# Patient Record
Sex: MALE | Race: WHITE | NOT HISPANIC OR LATINO | Employment: FULL TIME | URBAN - METROPOLITAN AREA
[De-identification: names, ages, dates, MRNs, and addresses within clinical notes are randomized per-mention and may not be internally consistent; named-entity substitution may affect disease eponyms.]

---

## 2021-02-04 ENCOUNTER — TRANSCRIBE ORDERS (OUTPATIENT)
Dept: ADMINISTRATIVE | Facility: HOSPITAL | Age: 51
End: 2021-02-04

## 2021-02-04 ENCOUNTER — HOSPITAL ENCOUNTER (OUTPATIENT)
Dept: CARDIOLOGY | Facility: HOSPITAL | Age: 51
Discharge: HOME | End: 2021-02-04
Admitting: NURSE PRACTITIONER

## 2021-02-04 DIAGNOSIS — M79.661 RIGHT CALF PAIN: ICD-10-CM

## 2021-02-04 DIAGNOSIS — M79.661 RIGHT CALF PAIN: Primary | ICD-10-CM

## 2021-02-04 LAB
BH CV LOWER VASCULAR LEFT COMMON FEMORAL AUGMENT: NORMAL
BH CV LOWER VASCULAR LEFT COMMON FEMORAL COMPETENT: NORMAL
BH CV LOWER VASCULAR LEFT COMMON FEMORAL COMPRESS: NORMAL
BH CV LOWER VASCULAR LEFT COMMON FEMORAL PHASIC: NORMAL
BH CV LOWER VASCULAR LEFT COMMON FEMORAL SPONT: NORMAL
BH CV LOWER VASCULAR RIGHT COMMON FEMORAL AUGMENT: NORMAL
BH CV LOWER VASCULAR RIGHT COMMON FEMORAL COMPETENT: NORMAL
BH CV LOWER VASCULAR RIGHT COMMON FEMORAL COMPRESS: NORMAL
BH CV LOWER VASCULAR RIGHT COMMON FEMORAL PHASIC: NORMAL
BH CV LOWER VASCULAR RIGHT COMMON FEMORAL SPONT: NORMAL
BH CV LOWER VASCULAR RIGHT DISTAL FEMORAL COMPRESS: NORMAL
BH CV LOWER VASCULAR RIGHT GASTRONEMIUS COMPRESS: NORMAL
BH CV LOWER VASCULAR RIGHT GREATER SAPH AK COMPRESS: NORMAL
BH CV LOWER VASCULAR RIGHT GREATER SAPH BK COMPRESS: NORMAL
BH CV LOWER VASCULAR RIGHT LESSER SAPH COMPRESS: NORMAL
BH CV LOWER VASCULAR RIGHT MID FEMORAL AUGMENT: NORMAL
BH CV LOWER VASCULAR RIGHT MID FEMORAL COMPETENT: NORMAL
BH CV LOWER VASCULAR RIGHT MID FEMORAL COMPRESS: NORMAL
BH CV LOWER VASCULAR RIGHT MID FEMORAL PHASIC: NORMAL
BH CV LOWER VASCULAR RIGHT MID FEMORAL SPONT: NORMAL
BH CV LOWER VASCULAR RIGHT PERONEAL COMPRESS: NORMAL
BH CV LOWER VASCULAR RIGHT POPLITEAL AUGMENT: NORMAL
BH CV LOWER VASCULAR RIGHT POPLITEAL COMPETENT: NORMAL
BH CV LOWER VASCULAR RIGHT POPLITEAL COMPRESS: NORMAL
BH CV LOWER VASCULAR RIGHT POPLITEAL PHASIC: NORMAL
BH CV LOWER VASCULAR RIGHT POPLITEAL SPONT: NORMAL
BH CV LOWER VASCULAR RIGHT POSTERIOR TIBIAL COMPRESS: NORMAL
BH CV LOWER VASCULAR RIGHT PROXIMAL FEMORAL COMPRESS: NORMAL
BH CV LOWER VASCULAR RIGHT SAPHENOFEMORAL JUNCTION COMPRESS: NORMAL
BH CV LOWER VASCULAR RIGHT VARICOSITY BK COMPRESS: NORMAL

## 2021-02-04 PROCEDURE — 93971 EXTREMITY STUDY: CPT

## 2022-05-20 ENCOUNTER — HOSPITAL ENCOUNTER (INPATIENT)
Facility: HOSPITAL | Age: 52
LOS: 4 days | Discharge: HOME | End: 2022-05-24
Attending: EMERGENCY MEDICINE | Admitting: INTERNAL MEDICINE

## 2022-05-20 ENCOUNTER — APPOINTMENT (OUTPATIENT)
Dept: GENERAL RADIOLOGY | Facility: HOSPITAL | Age: 52
End: 2022-05-20

## 2022-05-20 ENCOUNTER — APPOINTMENT (OUTPATIENT)
Dept: CT IMAGING | Facility: HOSPITAL | Age: 52
End: 2022-05-20

## 2022-05-20 DIAGNOSIS — R11.2 NAUSEA AND VOMITING, UNSPECIFIED VOMITING TYPE: ICD-10-CM

## 2022-05-20 DIAGNOSIS — K56.609 SMALL BOWEL OBSTRUCTION: Primary | ICD-10-CM

## 2022-05-20 LAB
ALBUMIN SERPL-MCNC: 4 G/DL (ref 3.5–5.2)
ALBUMIN/GLOB SERPL: 1.4 G/DL
ALP SERPL-CCNC: 103 U/L (ref 39–117)
ALT SERPL W P-5'-P-CCNC: 16 U/L (ref 1–41)
AMYLASE SERPL-CCNC: 30 U/L (ref 28–100)
ANION GAP SERPL CALCULATED.3IONS-SCNC: 12 MMOL/L (ref 5–15)
AST SERPL-CCNC: 14 U/L (ref 1–40)
BASOPHILS # BLD AUTO: 0 10*3/MM3 (ref 0–0.2)
BASOPHILS NFR BLD AUTO: 0.2 % (ref 0–1.5)
BILIRUB SERPL-MCNC: 0.5 MG/DL (ref 0–1.2)
BILIRUB UR QL STRIP: NEGATIVE
BUN SERPL-MCNC: 20 MG/DL (ref 6–20)
BUN/CREAT SERPL: 22 (ref 7–25)
CALCIUM SPEC-SCNC: 10.2 MG/DL (ref 8.6–10.5)
CHLORIDE SERPL-SCNC: 102 MMOL/L (ref 98–107)
CLARITY UR: ABNORMAL
CO2 SERPL-SCNC: 26 MMOL/L (ref 22–29)
COLOR UR: YELLOW
CREAT SERPL-MCNC: 0.91 MG/DL (ref 0.76–1.27)
DEPRECATED RDW RBC AUTO: 39.8 FL (ref 37–54)
EGFRCR SERPLBLD CKD-EPI 2021: 102.7 ML/MIN/1.73
EOSINOPHIL # BLD AUTO: 0 10*3/MM3 (ref 0–0.4)
EOSINOPHIL NFR BLD AUTO: 0 % (ref 0.3–6.2)
ERYTHROCYTE [DISTWIDTH] IN BLOOD BY AUTOMATED COUNT: 13.3 % (ref 12.3–15.4)
GLOBULIN UR ELPH-MCNC: 2.9 GM/DL
GLUCOSE SERPL-MCNC: 165 MG/DL (ref 65–99)
GLUCOSE UR STRIP-MCNC: NEGATIVE MG/DL
HCT VFR BLD AUTO: 47.7 % (ref 37.5–51)
HGB BLD-MCNC: 15.5 G/DL (ref 13–17.7)
HGB UR QL STRIP.AUTO: NEGATIVE
HOLD SPECIMEN: NORMAL
KETONES UR QL STRIP: ABNORMAL
LEUKOCYTE ESTERASE UR QL STRIP.AUTO: NEGATIVE
LIPASE SERPL-CCNC: 13 U/L (ref 13–60)
LYMPHOCYTES # BLD AUTO: 0.5 10*3/MM3 (ref 0.7–3.1)
LYMPHOCYTES NFR BLD AUTO: 5.5 % (ref 19.6–45.3)
MCH RBC QN AUTO: 28.4 PG (ref 26.6–33)
MCHC RBC AUTO-ENTMCNC: 32.5 G/DL (ref 31.5–35.7)
MCV RBC AUTO: 87.3 FL (ref 79–97)
MONOCYTES # BLD AUTO: 0.4 10*3/MM3 (ref 0.1–0.9)
MONOCYTES NFR BLD AUTO: 3.6 % (ref 5–12)
NEUTROPHILS NFR BLD AUTO: 8.8 10*3/MM3 (ref 1.7–7)
NEUTROPHILS NFR BLD AUTO: 90.7 % (ref 42.7–76)
NITRITE UR QL STRIP: NEGATIVE
NRBC BLD AUTO-RTO: 0 /100 WBC (ref 0–0.2)
PH UR STRIP.AUTO: 8 [PH] (ref 5–8)
PLATELET # BLD AUTO: 289 10*3/MM3 (ref 140–450)
PMV BLD AUTO: 8.5 FL (ref 6–12)
POTASSIUM SERPL-SCNC: 3.7 MMOL/L (ref 3.5–5.2)
PROT SERPL-MCNC: 6.9 G/DL (ref 6–8.5)
PROT UR QL STRIP: ABNORMAL
RBC # BLD AUTO: 5.46 10*6/MM3 (ref 4.14–5.8)
SARS-COV-2 RNA PNL SPEC NAA+PROBE: NOT DETECTED
SODIUM SERPL-SCNC: 140 MMOL/L (ref 136–145)
SP GR UR STRIP: 1.04 (ref 1–1.03)
UROBILINOGEN UR QL STRIP: ABNORMAL
WBC NRBC COR # BLD: 9.7 10*3/MM3 (ref 3.4–10.8)

## 2022-05-20 PROCEDURE — 81003 URINALYSIS AUTO W/O SCOPE: CPT

## 2022-05-20 PROCEDURE — 85025 COMPLETE CBC W/AUTO DIFF WBC: CPT

## 2022-05-20 PROCEDURE — 99284 EMERGENCY DEPT VISIT MOD MDM: CPT

## 2022-05-20 PROCEDURE — 25010000002 KETOROLAC TROMETHAMINE PER 15 MG

## 2022-05-20 PROCEDURE — 25010000002 ONDANSETRON PER 1 MG

## 2022-05-20 PROCEDURE — 87635 SARS-COV-2 COVID-19 AMP PRB: CPT | Performed by: EMERGENCY MEDICINE

## 2022-05-20 PROCEDURE — 82150 ASSAY OF AMYLASE: CPT

## 2022-05-20 PROCEDURE — 0 IOPAMIDOL PER 1 ML: Performed by: EMERGENCY MEDICINE

## 2022-05-20 PROCEDURE — 80053 COMPREHEN METABOLIC PANEL: CPT

## 2022-05-20 PROCEDURE — 74177 CT ABD & PELVIS W/CONTRAST: CPT

## 2022-05-20 PROCEDURE — 74018 RADEX ABDOMEN 1 VIEW: CPT

## 2022-05-20 PROCEDURE — 99233 SBSQ HOSP IP/OBS HIGH 50: CPT | Performed by: STUDENT IN AN ORGANIZED HEALTH CARE EDUCATION/TRAINING PROGRAM

## 2022-05-20 PROCEDURE — 83690 ASSAY OF LIPASE: CPT

## 2022-05-20 PROCEDURE — 25010000002 KETOROLAC TROMETHAMINE PER 15 MG: Performed by: NURSE PRACTITIONER

## 2022-05-20 RX ORDER — TRETINOIN 1 MG/G
1 CREAM TOPICAL NIGHTLY
COMMUNITY

## 2022-05-20 RX ORDER — FEXOFENADINE HCL AND PSEUDOEPHEDRINE HCI 180; 240 MG/1; MG/1
1 TABLET, EXTENDED RELEASE ORAL DAILY
COMMUNITY

## 2022-05-20 RX ORDER — SODIUM CHLORIDE 9 MG/ML
100 INJECTION, SOLUTION INTRAVENOUS CONTINUOUS
Status: DISCONTINUED | OUTPATIENT
Start: 2022-05-20 | End: 2022-05-20

## 2022-05-20 RX ORDER — SODIUM CHLORIDE 0.9 % (FLUSH) 0.9 %
10 SYRINGE (ML) INJECTION AS NEEDED
Status: DISCONTINUED | OUTPATIENT
Start: 2022-05-20 | End: 2022-05-24 | Stop reason: HOSPADM

## 2022-05-20 RX ORDER — SODIUM CHLORIDE 9 MG/ML
100 INJECTION, SOLUTION INTRAVENOUS CONTINUOUS
Status: DISCONTINUED | OUTPATIENT
Start: 2022-05-20 | End: 2022-05-23

## 2022-05-20 RX ORDER — MORPHINE SULFATE 2 MG/ML
2 INJECTION, SOLUTION INTRAMUSCULAR; INTRAVENOUS
Status: DISCONTINUED | OUTPATIENT
Start: 2022-05-20 | End: 2022-05-20

## 2022-05-20 RX ORDER — MINOCYCLINE HYDROCHLORIDE 100 MG/1
100 CAPSULE ORAL 2 TIMES DAILY PRN
COMMUNITY

## 2022-05-20 RX ORDER — ONDANSETRON 2 MG/ML
4 INJECTION INTRAMUSCULAR; INTRAVENOUS ONCE
Status: COMPLETED | OUTPATIENT
Start: 2022-05-20 | End: 2022-05-20

## 2022-05-20 RX ORDER — FAMOTIDINE 10 MG/ML
20 INJECTION, SOLUTION INTRAVENOUS EVERY 12 HOURS SCHEDULED
Status: DISCONTINUED | OUTPATIENT
Start: 2022-05-20 | End: 2022-05-24

## 2022-05-20 RX ORDER — HYDRALAZINE HYDROCHLORIDE 20 MG/ML
10 INJECTION INTRAMUSCULAR; INTRAVENOUS EVERY 6 HOURS PRN
Status: DISCONTINUED | OUTPATIENT
Start: 2022-05-20 | End: 2022-05-23

## 2022-05-20 RX ORDER — POTASSIUM CHLORIDE 7.45 MG/ML
10 INJECTION INTRAVENOUS
Status: DISCONTINUED | OUTPATIENT
Start: 2022-05-20 | End: 2022-05-24 | Stop reason: HOSPADM

## 2022-05-20 RX ORDER — NITROGLYCERIN 0.4 MG/1
0.4 TABLET SUBLINGUAL
Status: DISCONTINUED | OUTPATIENT
Start: 2022-05-20 | End: 2022-05-24 | Stop reason: HOSPADM

## 2022-05-20 RX ORDER — FLUTICASONE PROPIONATE 50 MCG
2 SPRAY, SUSPENSION (ML) NASAL DAILY
COMMUNITY

## 2022-05-20 RX ORDER — KETOROLAC TROMETHAMINE 15 MG/ML
15 INJECTION, SOLUTION INTRAMUSCULAR; INTRAVENOUS ONCE
Status: COMPLETED | OUTPATIENT
Start: 2022-05-20 | End: 2022-05-20

## 2022-05-20 RX ORDER — SODIUM CHLORIDE 0.9 % (FLUSH) 0.9 %
3-10 SYRINGE (ML) INJECTION AS NEEDED
Status: DISCONTINUED | OUTPATIENT
Start: 2022-05-20 | End: 2022-05-24 | Stop reason: HOSPADM

## 2022-05-20 RX ORDER — KETOROLAC TROMETHAMINE 30 MG/ML
30 INJECTION, SOLUTION INTRAMUSCULAR; INTRAVENOUS EVERY 6 HOURS PRN
Status: DISPENSED | OUTPATIENT
Start: 2022-05-20 | End: 2022-05-22

## 2022-05-20 RX ORDER — ONDANSETRON 4 MG/1
4 TABLET, FILM COATED ORAL EVERY 6 HOURS PRN
Status: DISCONTINUED | OUTPATIENT
Start: 2022-05-20 | End: 2022-05-24 | Stop reason: HOSPADM

## 2022-05-20 RX ORDER — SODIUM CHLORIDE 0.9 % (FLUSH) 0.9 %
3 SYRINGE (ML) INJECTION EVERY 12 HOURS SCHEDULED
Status: DISCONTINUED | OUTPATIENT
Start: 2022-05-20 | End: 2022-05-24 | Stop reason: HOSPADM

## 2022-05-20 RX ADMIN — KETOROLAC TROMETHAMINE 15 MG: 15 INJECTION, SOLUTION INTRAMUSCULAR; INTRAVENOUS at 08:07

## 2022-05-20 RX ADMIN — SODIUM CHLORIDE 1000 ML: 9 INJECTION, SOLUTION INTRAVENOUS at 08:07

## 2022-05-20 RX ADMIN — FAMOTIDINE 20 MG: 10 INJECTION INTRAVENOUS at 20:15

## 2022-05-20 RX ADMIN — SODIUM CHLORIDE 100 ML/HR: 9 INJECTION, SOLUTION INTRAVENOUS at 17:30

## 2022-05-20 RX ADMIN — IOPAMIDOL 100 ML: 755 INJECTION, SOLUTION INTRAVENOUS at 08:32

## 2022-05-20 RX ADMIN — SODIUM CHLORIDE 100 ML/HR: 9 INJECTION, SOLUTION INTRAVENOUS at 18:12

## 2022-05-20 RX ADMIN — ONDANSETRON 4 MG: 2 INJECTION INTRAMUSCULAR; INTRAVENOUS at 08:07

## 2022-05-20 RX ADMIN — KETOROLAC TROMETHAMINE 30 MG: 30 INJECTION, SOLUTION INTRAMUSCULAR; INTRAVENOUS at 17:30

## 2022-05-20 RX ADMIN — Medication 3 ML: at 20:15

## 2022-05-21 LAB
ANION GAP SERPL CALCULATED.3IONS-SCNC: 9 MMOL/L (ref 5–15)
BASOPHILS # BLD AUTO: 0 10*3/MM3 (ref 0–0.2)
BASOPHILS NFR BLD AUTO: 0.5 % (ref 0–1.5)
BUN SERPL-MCNC: 15 MG/DL (ref 6–20)
BUN/CREAT SERPL: 18.8 (ref 7–25)
CALCIUM SPEC-SCNC: 8.6 MG/DL (ref 8.6–10.5)
CHLORIDE SERPL-SCNC: 106 MMOL/L (ref 98–107)
CO2 SERPL-SCNC: 26 MMOL/L (ref 22–29)
CREAT SERPL-MCNC: 0.8 MG/DL (ref 0.76–1.27)
DEPRECATED RDW RBC AUTO: 41.6 FL (ref 37–54)
EGFRCR SERPLBLD CKD-EPI 2021: 107.8 ML/MIN/1.73
EOSINOPHIL # BLD AUTO: 0.1 10*3/MM3 (ref 0–0.4)
EOSINOPHIL NFR BLD AUTO: 2 % (ref 0.3–6.2)
ERYTHROCYTE [DISTWIDTH] IN BLOOD BY AUTOMATED COUNT: 13.5 % (ref 12.3–15.4)
GLUCOSE SERPL-MCNC: 94 MG/DL (ref 65–99)
HCT VFR BLD AUTO: 42.2 % (ref 37.5–51)
HGB BLD-MCNC: 14.1 G/DL (ref 13–17.7)
LYMPHOCYTES # BLD AUTO: 1.3 10*3/MM3 (ref 0.7–3.1)
LYMPHOCYTES NFR BLD AUTO: 18.4 % (ref 19.6–45.3)
MCH RBC QN AUTO: 29.2 PG (ref 26.6–33)
MCHC RBC AUTO-ENTMCNC: 33.4 G/DL (ref 31.5–35.7)
MCV RBC AUTO: 87.6 FL (ref 79–97)
MONOCYTES # BLD AUTO: 0.4 10*3/MM3 (ref 0.1–0.9)
MONOCYTES NFR BLD AUTO: 6.2 % (ref 5–12)
NEUTROPHILS NFR BLD AUTO: 5 10*3/MM3 (ref 1.7–7)
NEUTROPHILS NFR BLD AUTO: 72.9 % (ref 42.7–76)
NRBC BLD AUTO-RTO: 0.1 /100 WBC (ref 0–0.2)
PLATELET # BLD AUTO: 257 10*3/MM3 (ref 140–450)
PMV BLD AUTO: 8.7 FL (ref 6–12)
POTASSIUM SERPL-SCNC: 3.8 MMOL/L (ref 3.5–5.2)
RBC # BLD AUTO: 4.82 10*6/MM3 (ref 4.14–5.8)
SODIUM SERPL-SCNC: 141 MMOL/L (ref 136–145)
WBC NRBC COR # BLD: 6.9 10*3/MM3 (ref 3.4–10.8)

## 2022-05-21 PROCEDURE — 85025 COMPLETE CBC W/AUTO DIFF WBC: CPT | Performed by: INTERNAL MEDICINE

## 2022-05-21 PROCEDURE — 36415 COLL VENOUS BLD VENIPUNCTURE: CPT | Performed by: INTERNAL MEDICINE

## 2022-05-21 PROCEDURE — 99232 SBSQ HOSP IP/OBS MODERATE 35: CPT | Performed by: STUDENT IN AN ORGANIZED HEALTH CARE EDUCATION/TRAINING PROGRAM

## 2022-05-21 PROCEDURE — 80048 BASIC METABOLIC PNL TOTAL CA: CPT | Performed by: INTERNAL MEDICINE

## 2022-05-21 PROCEDURE — 25010000002 KETOROLAC TROMETHAMINE PER 15 MG: Performed by: NURSE PRACTITIONER

## 2022-05-21 PROCEDURE — 25010000002 HYDRALAZINE PER 20 MG: Performed by: INTERNAL MEDICINE

## 2022-05-21 RX ADMIN — HYDRALAZINE HYDROCHLORIDE 10 MG: 20 INJECTION INTRAMUSCULAR; INTRAVENOUS at 18:14

## 2022-05-21 RX ADMIN — FAMOTIDINE 20 MG: 10 INJECTION INTRAVENOUS at 09:31

## 2022-05-21 RX ADMIN — FAMOTIDINE 20 MG: 10 INJECTION INTRAVENOUS at 19:53

## 2022-05-21 RX ADMIN — Medication 3 ML: at 09:31

## 2022-05-21 RX ADMIN — SODIUM CHLORIDE 100 ML/HR: 9 INJECTION, SOLUTION INTRAVENOUS at 20:12

## 2022-05-21 RX ADMIN — KETOROLAC TROMETHAMINE 30 MG: 30 INJECTION, SOLUTION INTRAMUSCULAR; INTRAVENOUS at 10:27

## 2022-05-21 RX ADMIN — HYDRALAZINE HYDROCHLORIDE 10 MG: 20 INJECTION INTRAMUSCULAR; INTRAVENOUS at 04:45

## 2022-05-21 RX ADMIN — KETOROLAC TROMETHAMINE 30 MG: 30 INJECTION, SOLUTION INTRAMUSCULAR; INTRAVENOUS at 19:53

## 2022-05-21 RX ADMIN — KETOROLAC TROMETHAMINE 30 MG: 30 INJECTION, SOLUTION INTRAMUSCULAR; INTRAVENOUS at 02:30

## 2022-05-22 LAB
ANION GAP SERPL CALCULATED.3IONS-SCNC: 15 MMOL/L (ref 5–15)
BASOPHILS # BLD AUTO: 0 10*3/MM3 (ref 0–0.2)
BASOPHILS NFR BLD AUTO: 0.2 % (ref 0–1.5)
BUN SERPL-MCNC: 12 MG/DL (ref 6–20)
BUN/CREAT SERPL: 15.2 (ref 7–25)
CALCIUM SPEC-SCNC: 8.8 MG/DL (ref 8.6–10.5)
CHLORIDE SERPL-SCNC: 100 MMOL/L (ref 98–107)
CO2 SERPL-SCNC: 21 MMOL/L (ref 22–29)
CREAT SERPL-MCNC: 0.79 MG/DL (ref 0.76–1.27)
DEPRECATED RDW RBC AUTO: 41.6 FL (ref 37–54)
EGFRCR SERPLBLD CKD-EPI 2021: 108.2 ML/MIN/1.73
EOSINOPHIL # BLD AUTO: 0.2 10*3/MM3 (ref 0–0.4)
EOSINOPHIL NFR BLD AUTO: 2.3 % (ref 0.3–6.2)
ERYTHROCYTE [DISTWIDTH] IN BLOOD BY AUTOMATED COUNT: 13.5 % (ref 12.3–15.4)
GLUCOSE SERPL-MCNC: 73 MG/DL (ref 65–99)
HCT VFR BLD AUTO: 42.1 % (ref 37.5–51)
HGB BLD-MCNC: 14.1 G/DL (ref 13–17.7)
LYMPHOCYTES # BLD AUTO: 1.2 10*3/MM3 (ref 0.7–3.1)
LYMPHOCYTES NFR BLD AUTO: 12.3 % (ref 19.6–45.3)
MCH RBC QN AUTO: 29.5 PG (ref 26.6–33)
MCHC RBC AUTO-ENTMCNC: 33.5 G/DL (ref 31.5–35.7)
MCV RBC AUTO: 88 FL (ref 79–97)
MONOCYTES # BLD AUTO: 0.7 10*3/MM3 (ref 0.1–0.9)
MONOCYTES NFR BLD AUTO: 6.5 % (ref 5–12)
NEUTROPHILS NFR BLD AUTO: 7.9 10*3/MM3 (ref 1.7–7)
NEUTROPHILS NFR BLD AUTO: 78.7 % (ref 42.7–76)
NRBC BLD AUTO-RTO: 0.1 /100 WBC (ref 0–0.2)
PLATELET # BLD AUTO: 248 10*3/MM3 (ref 140–450)
PMV BLD AUTO: 8.6 FL (ref 6–12)
POTASSIUM SERPL-SCNC: 3.5 MMOL/L (ref 3.5–5.2)
RBC # BLD AUTO: 4.78 10*6/MM3 (ref 4.14–5.8)
SODIUM SERPL-SCNC: 136 MMOL/L (ref 136–145)
WBC NRBC COR # BLD: 10.1 10*3/MM3 (ref 3.4–10.8)

## 2022-05-22 PROCEDURE — 99232 SBSQ HOSP IP/OBS MODERATE 35: CPT | Performed by: STUDENT IN AN ORGANIZED HEALTH CARE EDUCATION/TRAINING PROGRAM

## 2022-05-22 PROCEDURE — 25010000002 KETOROLAC TROMETHAMINE PER 15 MG: Performed by: NURSE PRACTITIONER

## 2022-05-22 PROCEDURE — 25010000002 DIPHENHYDRAMINE PER 50 MG: Performed by: FAMILY MEDICINE

## 2022-05-22 PROCEDURE — 25010000002 HYDRALAZINE PER 20 MG: Performed by: INTERNAL MEDICINE

## 2022-05-22 PROCEDURE — 80048 BASIC METABOLIC PNL TOTAL CA: CPT | Performed by: INTERNAL MEDICINE

## 2022-05-22 PROCEDURE — 85025 COMPLETE CBC W/AUTO DIFF WBC: CPT | Performed by: INTERNAL MEDICINE

## 2022-05-22 RX ORDER — KETOROLAC TROMETHAMINE 30 MG/ML
30 INJECTION, SOLUTION INTRAMUSCULAR; INTRAVENOUS EVERY 6 HOURS PRN
Status: DISCONTINUED | OUTPATIENT
Start: 2022-05-22 | End: 2022-05-24 | Stop reason: HOSPADM

## 2022-05-22 RX ORDER — AZELASTINE 1 MG/ML
2 SPRAY, METERED NASAL 2 TIMES DAILY
Status: DISCONTINUED | OUTPATIENT
Start: 2022-05-22 | End: 2022-05-24 | Stop reason: HOSPADM

## 2022-05-22 RX ORDER — DIPHENHYDRAMINE HYDROCHLORIDE 50 MG/ML
12.5 INJECTION INTRAMUSCULAR; INTRAVENOUS EVERY 6 HOURS PRN
Status: DISCONTINUED | OUTPATIENT
Start: 2022-05-22 | End: 2022-05-24 | Stop reason: HOSPADM

## 2022-05-22 RX ADMIN — FAMOTIDINE 20 MG: 10 INJECTION INTRAVENOUS at 20:51

## 2022-05-22 RX ADMIN — HYDRALAZINE HYDROCHLORIDE 10 MG: 20 INJECTION INTRAMUSCULAR; INTRAVENOUS at 12:25

## 2022-05-22 RX ADMIN — Medication 3 ML: at 08:31

## 2022-05-22 RX ADMIN — DIPHENHYDRAMINE HYDROCHLORIDE 12.5 MG: 50 INJECTION, SOLUTION INTRAMUSCULAR; INTRAVENOUS at 13:25

## 2022-05-22 RX ADMIN — PHENOL 1 SPRAY: 1.4 SPRAY ORAL at 10:47

## 2022-05-22 RX ADMIN — KETOROLAC TROMETHAMINE 30 MG: 30 INJECTION, SOLUTION INTRAMUSCULAR; INTRAVENOUS at 13:27

## 2022-05-22 RX ADMIN — HYDRALAZINE HYDROCHLORIDE 10 MG: 20 INJECTION INTRAMUSCULAR; INTRAVENOUS at 20:51

## 2022-05-22 RX ADMIN — PHENOL 1 SPRAY: 1.4 SPRAY ORAL at 20:55

## 2022-05-22 RX ADMIN — AZELASTINE HYDROCHLORIDE 2 SPRAY: 137 SPRAY, METERED NASAL at 13:25

## 2022-05-22 RX ADMIN — Medication 3 ML: at 20:51

## 2022-05-22 RX ADMIN — SODIUM CHLORIDE 100 ML/HR: 9 INJECTION, SOLUTION INTRAVENOUS at 06:03

## 2022-05-22 RX ADMIN — FAMOTIDINE 20 MG: 10 INJECTION INTRAVENOUS at 08:31

## 2022-05-22 RX ADMIN — AZELASTINE HYDROCHLORIDE 2 SPRAY: 137 SPRAY, METERED NASAL at 20:51

## 2022-05-23 ENCOUNTER — APPOINTMENT (OUTPATIENT)
Dept: GENERAL RADIOLOGY | Facility: HOSPITAL | Age: 52
End: 2022-05-23

## 2022-05-23 LAB
ANION GAP SERPL CALCULATED.3IONS-SCNC: 20 MMOL/L (ref 5–15)
BASOPHILS # BLD AUTO: 0.1 10*3/MM3 (ref 0–0.2)
BASOPHILS NFR BLD AUTO: 0.6 % (ref 0–1.5)
BUN SERPL-MCNC: 13 MG/DL (ref 6–20)
BUN/CREAT SERPL: 14.4 (ref 7–25)
CALCIUM SPEC-SCNC: 9.2 MG/DL (ref 8.6–10.5)
CHLORIDE SERPL-SCNC: 102 MMOL/L (ref 98–107)
CO2 SERPL-SCNC: 17 MMOL/L (ref 22–29)
CREAT SERPL-MCNC: 0.9 MG/DL (ref 0.76–1.27)
DEPRECATED RDW RBC AUTO: 41.1 FL (ref 37–54)
EGFRCR SERPLBLD CKD-EPI 2021: 104 ML/MIN/1.73
EOSINOPHIL # BLD AUTO: 0.1 10*3/MM3 (ref 0–0.4)
EOSINOPHIL NFR BLD AUTO: 1 % (ref 0.3–6.2)
ERYTHROCYTE [DISTWIDTH] IN BLOOD BY AUTOMATED COUNT: 13.3 % (ref 12.3–15.4)
GLUCOSE SERPL-MCNC: 127 MG/DL (ref 65–99)
HCT VFR BLD AUTO: 42.7 % (ref 37.5–51)
HGB BLD-MCNC: 14.7 G/DL (ref 13–17.7)
LYMPHOCYTES # BLD AUTO: 1.4 10*3/MM3 (ref 0.7–3.1)
LYMPHOCYTES NFR BLD AUTO: 13.4 % (ref 19.6–45.3)
MCH RBC QN AUTO: 29.8 PG (ref 26.6–33)
MCHC RBC AUTO-ENTMCNC: 34.3 G/DL (ref 31.5–35.7)
MCV RBC AUTO: 87 FL (ref 79–97)
MONOCYTES # BLD AUTO: 0.7 10*3/MM3 (ref 0.1–0.9)
MONOCYTES NFR BLD AUTO: 7 % (ref 5–12)
NEUTROPHILS NFR BLD AUTO: 7.9 10*3/MM3 (ref 1.7–7)
NEUTROPHILS NFR BLD AUTO: 78 % (ref 42.7–76)
NRBC BLD AUTO-RTO: 0.1 /100 WBC (ref 0–0.2)
PLATELET # BLD AUTO: 292 10*3/MM3 (ref 140–450)
PMV BLD AUTO: 8.9 FL (ref 6–12)
POTASSIUM SERPL-SCNC: 4 MMOL/L (ref 3.5–5.2)
RBC # BLD AUTO: 4.91 10*6/MM3 (ref 4.14–5.8)
SODIUM SERPL-SCNC: 139 MMOL/L (ref 136–145)
WBC NRBC COR # BLD: 10.1 10*3/MM3 (ref 3.4–10.8)

## 2022-05-23 PROCEDURE — 85025 COMPLETE CBC W/AUTO DIFF WBC: CPT | Performed by: INTERNAL MEDICINE

## 2022-05-23 PROCEDURE — 0 IOPAMIDOL PER 1 ML: Performed by: INTERNAL MEDICINE

## 2022-05-23 PROCEDURE — 99232 SBSQ HOSP IP/OBS MODERATE 35: CPT | Performed by: STUDENT IN AN ORGANIZED HEALTH CARE EDUCATION/TRAINING PROGRAM

## 2022-05-23 PROCEDURE — 74250 X-RAY XM SM INT 1CNTRST STD: CPT

## 2022-05-23 PROCEDURE — 36415 COLL VENOUS BLD VENIPUNCTURE: CPT | Performed by: INTERNAL MEDICINE

## 2022-05-23 PROCEDURE — 80048 BASIC METABOLIC PNL TOTAL CA: CPT | Performed by: INTERNAL MEDICINE

## 2022-05-23 PROCEDURE — 25010000002 METOCLOPRAMIDE PER 10 MG: Performed by: NURSE PRACTITIONER

## 2022-05-23 PROCEDURE — 25010000002 KETOROLAC TROMETHAMINE PER 15 MG: Performed by: NURSE PRACTITIONER

## 2022-05-23 RX ORDER — LABETALOL HYDROCHLORIDE 5 MG/ML
10 INJECTION, SOLUTION INTRAVENOUS EVERY 6 HOURS PRN
Status: DISCONTINUED | OUTPATIENT
Start: 2022-05-23 | End: 2022-05-24 | Stop reason: HOSPADM

## 2022-05-23 RX ORDER — METOCLOPRAMIDE HYDROCHLORIDE 5 MG/ML
5 INJECTION INTRAMUSCULAR; INTRAVENOUS
Status: DISCONTINUED | OUTPATIENT
Start: 2022-05-23 | End: 2022-05-24

## 2022-05-23 RX ORDER — AMLODIPINE BESYLATE 5 MG/1
5 TABLET ORAL
Status: DISCONTINUED | OUTPATIENT
Start: 2022-05-24 | End: 2022-05-24 | Stop reason: HOSPADM

## 2022-05-23 RX ORDER — AMLODIPINE BESYLATE 5 MG/1
5 TABLET ORAL ONCE
Status: COMPLETED | OUTPATIENT
Start: 2022-05-23 | End: 2022-05-23

## 2022-05-23 RX ORDER — LORAZEPAM 0.5 MG/1
0.5 TABLET ORAL 3 TIMES DAILY PRN
Status: DISCONTINUED | OUTPATIENT
Start: 2022-05-23 | End: 2022-05-24 | Stop reason: HOSPADM

## 2022-05-23 RX ADMIN — SODIUM CHLORIDE 100 ML/HR: 9 INJECTION, SOLUTION INTRAVENOUS at 00:14

## 2022-05-23 RX ADMIN — METOCLOPRAMIDE 5 MG: 5 INJECTION, SOLUTION INTRAMUSCULAR; INTRAVENOUS at 20:47

## 2022-05-23 RX ADMIN — IOPAMIDOL 200 ML: 755 INJECTION, SOLUTION INTRAVENOUS at 14:42

## 2022-05-23 RX ADMIN — Medication 3 ML: at 20:49

## 2022-05-23 RX ADMIN — AZELASTINE HYDROCHLORIDE 2 SPRAY: 137 SPRAY, METERED NASAL at 09:10

## 2022-05-23 RX ADMIN — AZELASTINE HYDROCHLORIDE 2 SPRAY: 137 SPRAY, METERED NASAL at 20:48

## 2022-05-23 RX ADMIN — FAMOTIDINE 20 MG: 10 INJECTION INTRAVENOUS at 20:47

## 2022-05-23 RX ADMIN — KETOROLAC TROMETHAMINE 30 MG: 30 INJECTION, SOLUTION INTRAMUSCULAR at 02:28

## 2022-05-23 RX ADMIN — LABETALOL HYDROCHLORIDE 10 MG: 5 INJECTION INTRAVENOUS at 16:59

## 2022-05-23 RX ADMIN — AMLODIPINE BESYLATE 5 MG: 5 TABLET ORAL at 22:35

## 2022-05-23 RX ADMIN — Medication 3 ML: at 09:15

## 2022-05-23 RX ADMIN — KETOROLAC TROMETHAMINE 30 MG: 30 INJECTION, SOLUTION INTRAMUSCULAR at 16:10

## 2022-05-23 RX ADMIN — FAMOTIDINE 20 MG: 10 INJECTION INTRAVENOUS at 09:10

## 2022-05-24 VITALS
RESPIRATION RATE: 20 BRPM | OXYGEN SATURATION: 98 % | WEIGHT: 223 LBS | TEMPERATURE: 98.1 F | DIASTOLIC BLOOD PRESSURE: 95 MMHG | HEART RATE: 77 BPM | SYSTOLIC BLOOD PRESSURE: 167 MMHG | BODY MASS INDEX: 28.62 KG/M2 | HEIGHT: 74 IN

## 2022-05-24 LAB
ANION GAP SERPL CALCULATED.3IONS-SCNC: 11 MMOL/L (ref 5–15)
BASOPHILS # BLD AUTO: 0 10*3/MM3 (ref 0–0.2)
BASOPHILS NFR BLD AUTO: 0.4 % (ref 0–1.5)
BUN SERPL-MCNC: 12 MG/DL (ref 6–20)
BUN/CREAT SERPL: 16.2 (ref 7–25)
CALCIUM SPEC-SCNC: 9.1 MG/DL (ref 8.6–10.5)
CHLORIDE SERPL-SCNC: 103 MMOL/L (ref 98–107)
CO2 SERPL-SCNC: 24 MMOL/L (ref 22–29)
CREAT SERPL-MCNC: 0.74 MG/DL (ref 0.76–1.27)
DEPRECATED RDW RBC AUTO: 40.3 FL (ref 37–54)
EGFRCR SERPLBLD CKD-EPI 2021: 110.4 ML/MIN/1.73
EOSINOPHIL # BLD AUTO: 0.2 10*3/MM3 (ref 0–0.4)
EOSINOPHIL NFR BLD AUTO: 3.9 % (ref 0.3–6.2)
ERYTHROCYTE [DISTWIDTH] IN BLOOD BY AUTOMATED COUNT: 13.3 % (ref 12.3–15.4)
GLUCOSE SERPL-MCNC: 96 MG/DL (ref 65–99)
HCT VFR BLD AUTO: 37.2 % (ref 37.5–51)
HGB BLD-MCNC: 12.8 G/DL (ref 13–17.7)
LYMPHOCYTES # BLD AUTO: 1.9 10*3/MM3 (ref 0.7–3.1)
LYMPHOCYTES NFR BLD AUTO: 31.4 % (ref 19.6–45.3)
MCH RBC QN AUTO: 29.7 PG (ref 26.6–33)
MCHC RBC AUTO-ENTMCNC: 34.5 G/DL (ref 31.5–35.7)
MCV RBC AUTO: 86.1 FL (ref 79–97)
MONOCYTES # BLD AUTO: 0.6 10*3/MM3 (ref 0.1–0.9)
MONOCYTES NFR BLD AUTO: 10.7 % (ref 5–12)
NEUTROPHILS NFR BLD AUTO: 3.2 10*3/MM3 (ref 1.7–7)
NEUTROPHILS NFR BLD AUTO: 53.6 % (ref 42.7–76)
NRBC BLD AUTO-RTO: 0 /100 WBC (ref 0–0.2)
PLATELET # BLD AUTO: 253 10*3/MM3 (ref 140–450)
PMV BLD AUTO: 8.3 FL (ref 6–12)
POTASSIUM SERPL-SCNC: 3.6 MMOL/L (ref 3.5–5.2)
RBC # BLD AUTO: 4.32 10*6/MM3 (ref 4.14–5.8)
SODIUM SERPL-SCNC: 138 MMOL/L (ref 136–145)
WBC NRBC COR # BLD: 6 10*3/MM3 (ref 3.4–10.8)

## 2022-05-24 PROCEDURE — 85025 COMPLETE CBC W/AUTO DIFF WBC: CPT | Performed by: INTERNAL MEDICINE

## 2022-05-24 PROCEDURE — 80048 BASIC METABOLIC PNL TOTAL CA: CPT | Performed by: INTERNAL MEDICINE

## 2022-05-24 PROCEDURE — 25010000002 METOCLOPRAMIDE PER 10 MG: Performed by: NURSE PRACTITIONER

## 2022-05-24 PROCEDURE — 99231 SBSQ HOSP IP/OBS SF/LOW 25: CPT | Performed by: STUDENT IN AN ORGANIZED HEALTH CARE EDUCATION/TRAINING PROGRAM

## 2022-05-24 RX ORDER — METOCLOPRAMIDE 5 MG/1
5 TABLET ORAL
Status: DISCONTINUED | OUTPATIENT
Start: 2022-05-24 | End: 2022-05-24 | Stop reason: HOSPADM

## 2022-05-24 RX ORDER — FAMOTIDINE 20 MG/1
20 TABLET, FILM COATED ORAL
Status: DISCONTINUED | OUTPATIENT
Start: 2022-05-24 | End: 2022-05-24 | Stop reason: HOSPADM

## 2022-05-24 RX ORDER — ACETAMINOPHEN 325 MG/1
650 TABLET ORAL EVERY 6 HOURS PRN
Status: DISCONTINUED | OUTPATIENT
Start: 2022-05-24 | End: 2022-05-24 | Stop reason: HOSPADM

## 2022-05-24 RX ORDER — AMLODIPINE BESYLATE 5 MG/1
5 TABLET ORAL
Qty: 30 TABLET | Refills: 0 | Status: SHIPPED | OUTPATIENT
Start: 2022-05-25

## 2022-05-24 RX ADMIN — METOCLOPRAMIDE 5 MG: 5 TABLET ORAL at 12:53

## 2022-05-24 RX ADMIN — AMLODIPINE BESYLATE 5 MG: 5 TABLET ORAL at 12:53

## 2022-05-24 RX ADMIN — FAMOTIDINE 20 MG: 10 INJECTION INTRAVENOUS at 08:00

## 2022-05-24 RX ADMIN — METOCLOPRAMIDE 5 MG: 5 INJECTION, SOLUTION INTRAMUSCULAR; INTRAVENOUS at 07:59

## 2022-05-24 RX ADMIN — METOPROLOL TARTRATE 12.5 MG: 25 TABLET, FILM COATED ORAL at 12:53

## 2022-05-24 RX ADMIN — ACETAMINOPHEN 650 MG: 325 TABLET ORAL at 08:00

## 2022-05-24 RX ADMIN — AZELASTINE HYDROCHLORIDE 2 SPRAY: 137 SPRAY, METERED NASAL at 08:00

## 2022-05-24 RX ADMIN — Medication 3 ML: at 08:00

## 2022-05-25 ENCOUNTER — READMISSION MANAGEMENT (OUTPATIENT)
Dept: CALL CENTER | Facility: HOSPITAL | Age: 52
End: 2022-05-25

## 2022-05-25 NOTE — OUTREACH NOTE
Prep Survey    Flowsheet Row Responses   [facility] facility patient discharged from? [facility]   Is LACE score < 7 ? No   Emergency Room discharge w/ pulse ox? No   Eligibility Readm Mgmt   Discharge diagnosis Small bowel obstruction    Does the patient have one of the following disease processes/diagnoses(primary or secondary)? Other   Does the patient have Home health ordered? No   Is there a DME ordered? No   Prep survey completed? Yes          JONG RATLIFF - Registered Nurse

## 2022-06-07 ENCOUNTER — READMISSION MANAGEMENT (OUTPATIENT)
Dept: CALL CENTER | Facility: HOSPITAL | Age: 52
End: 2022-06-07

## 2022-06-07 NOTE — OUTREACH NOTE
Medical Week 2 Survey    Flowsheet Row Responses   [facility] facility patient discharged from? [facility]   Does the patient have one of the following disease processes/diagnoses(primary or secondary)? Other   Week 2 attempt successful? No   Unsuccessful attempts Attempt 1          MAEVE MCKNIGHT - Registered Nurse

## 2022-06-10 ENCOUNTER — OFFICE VISIT (OUTPATIENT)
Dept: SURGERY | Facility: CLINIC | Age: 52
End: 2022-06-10

## 2022-06-10 VITALS
BODY MASS INDEX: 28.08 KG/M2 | OXYGEN SATURATION: 97 % | WEIGHT: 218.8 LBS | SYSTOLIC BLOOD PRESSURE: 159 MMHG | TEMPERATURE: 98 F | HEIGHT: 74 IN | HEART RATE: 69 BPM | DIASTOLIC BLOOD PRESSURE: 96 MMHG

## 2022-06-10 DIAGNOSIS — K56.609 SMALL BOWEL OBSTRUCTION: Primary | ICD-10-CM

## 2022-06-10 PROCEDURE — 99213 OFFICE O/P EST LOW 20 MIN: CPT | Performed by: STUDENT IN AN ORGANIZED HEALTH CARE EDUCATION/TRAINING PROGRAM

## 2022-06-10 RX ORDER — METRONIDAZOLE 7.5 MG/G
GEL TOPICAL
COMMUNITY
Start: 2022-04-30

## 2022-06-10 RX ORDER — CETIRIZINE HYDROCHLORIDE 10 MG/1
10 TABLET ORAL DAILY
COMMUNITY
Start: 2022-04-02

## 2022-06-10 NOTE — PROGRESS NOTES
"Chief Complaint  Follow-up (Follow-up Hospital sm bowel obstruction)    Subjective        Efrain Garcia presents to [facility] GENERAL SURGERY  History of Present Illness    52-year-old gentleman with history of umbilical hernia repair with mesh, inguinal hernia repair, no other significant past medical or surgical history who presents to my office to discuss his recent small bowel obstruction.  He was admitted with nausea and vomiting, small bowel follow-through with normal transit time.  However, his CT abdomen pelvis was concerning for partial small bowel obstruction.  He has never had colonoscopy before.    Objective   Vital Signs:  /96 (Cuff Size: Adult)   Pulse 69   Temp 98 °F (36.7 °C) (Infrared)   Ht 188 cm (74\")   Wt 99.2 kg (218 lb 12.8 oz)   SpO2 97%   BMI 28.09 kg/m²   Estimated body mass index is 28.09 kg/m² as calculated from the following:    Height as of this encounter: 188 cm (74\").    Weight as of this encounter: 99.2 kg (218 lb 12.8 oz).          Physical Exam  Constitutional:       General: He is not in acute distress.     Appearance: Normal appearance. He is not ill-appearing.   HENT:      Head: Normocephalic and atraumatic.      Right Ear: External ear normal.      Left Ear: External ear normal.   Eyes:      Extraocular Movements: Extraocular movements intact.      Conjunctiva/sclera: Conjunctivae normal.   Cardiovascular:      Rate and Rhythm: Normal rate and regular rhythm.   Pulmonary:      Effort: Pulmonary effort is normal. No respiratory distress.   Abdominal:      General: There is no distension.      Palpations: Abdomen is soft.      Tenderness: There is no abdominal tenderness.   Musculoskeletal:         General: No swelling or deformity.   Skin:     General: Skin is warm and dry.   Neurological:      Mental Status: He is alert and oriented to person, place, and time. Mental status is at baseline.        Result Review :                Assessment and " Plan   Diagnoses and all orders for this visit:    1. Small bowel obstruction (HCC) (Primary)      52-year-old gentleman with history of umbilical hernia repair with mesh, inguinal hernia repair, no other significant past medical or surgical history who presents to my office to discuss his recent small bowel obstruction.  He was admitted with nausea and vomiting, small bowel follow-through with normal transit time.  However, his CT abdomen pelvis was concerning for partial small bowel obstruction.  He has never had colonoscopy before.  Given his age and no history of colonoscopy I recommended screening colonoscopy.  Patient is hesitant to undergo bowel prep at this time as he says his bowels just became normal again.  He would like to discuss with his primary care physician to do a possible Cologuard test.  We discussed return precautions if he develops signs symptoms of small bowel obstruction again, otherwise he can follow-up with me again in the future if needed.       Follow Up   No follow-ups on file.  Patient was given instructions and counseling regarding his condition or for health maintenance advice. Please see specific information pulled into the AVS if appropriate.